# Patient Record
Sex: FEMALE | Race: WHITE | Employment: FULL TIME | URBAN - METROPOLITAN AREA
[De-identification: names, ages, dates, MRNs, and addresses within clinical notes are randomized per-mention and may not be internally consistent; named-entity substitution may affect disease eponyms.]

---

## 2019-03-09 ENCOUNTER — OFFICE VISIT (OUTPATIENT)
Dept: URGENT CARE | Facility: URGENT CARE | Age: 34
End: 2019-03-09
Payer: COMMERCIAL

## 2019-03-09 VITALS
DIASTOLIC BLOOD PRESSURE: 70 MMHG | SYSTOLIC BLOOD PRESSURE: 112 MMHG | TEMPERATURE: 98.3 F | OXYGEN SATURATION: 97 % | HEART RATE: 100 BPM

## 2019-03-09 DIAGNOSIS — R07.0 THROAT PAIN: Primary | ICD-10-CM

## 2019-03-09 DIAGNOSIS — H92.03 OTALGIA OF BOTH EARS: ICD-10-CM

## 2019-03-09 DIAGNOSIS — J02.0 STREPTOCOCCAL PHARYNGITIS: ICD-10-CM

## 2019-03-09 LAB
DEPRECATED S PYO AG THROAT QL EIA: ABNORMAL
SPECIMEN SOURCE: ABNORMAL

## 2019-03-09 PROCEDURE — 99213 OFFICE O/P EST LOW 20 MIN: CPT | Performed by: FAMILY MEDICINE

## 2019-03-09 PROCEDURE — 87880 STREP A ASSAY W/OPTIC: CPT | Performed by: PHYSICIAN ASSISTANT

## 2019-03-09 RX ORDER — PENICILLIN V POTASSIUM 500 MG/1
500 TABLET, FILM COATED ORAL 2 TIMES DAILY
Qty: 20 TABLET | Refills: 0 | Status: SHIPPED | OUTPATIENT
Start: 2019-03-09 | End: 2019-03-19

## 2019-03-09 NOTE — PROGRESS NOTES
Chief Complaint   Patient presents with     Urgent Care     Pharyngitis     Possible strep x4 days on/off- sore throat, Bilateral ear pain, hurts to swallow     SUBJECTIVE:   Mavis Golden is a 33 year old female presenting with a chief complaint of runny nose, stuffy nose, sore throat and facial pain/pressure. She is an established patient of [facility].  Onset of symptoms was 4 day(s) ago.  Course of illness is worsening.    Severity moderate  Current and Associated symptoms: ear pain bilateral  Treatment measures tried include Tylenol/Ibuprofen.  Predisposing factors include None.    Past Medical History:   Diagnosis Date     NO ACTIVE PROBLEMS      Current Outpatient Medications   Medication Sig Dispense Refill     penicillin V (VEETID) 500 MG tablet Take 1 tablet (500 mg) by mouth 2 times daily for 10 days 20 tablet 0     drospirenone-ethinyl estradiol (OCELLA) 3-0.03 MG per tablet Take 1 tablet by mouth daily. (Patient not taking: Reported on 3/9/2019) 28 tablet 0     VALTREX 1 GM OR TABS 1 TABLET 3 TIMES DAILY 21 0     SANDRA 28 3-0.03 MG OR TABS ONE TABLET DAILY, generics ok (Patient not taking: No sig reported) 90 3     ZOLOFT 50 MG OR TABS ONE TABLET DAILY (Patient not taking: No sig reported) 90 1     Social History     Tobacco Use     Smoking status: Never Smoker     Smokeless tobacco: Never Used   Substance Use Topics     Alcohol use: Yes     Comment: Occ     Family History   Problem Relation Age of Onset     Breast Cancer Paternal Grandmother         dx'ed age 41         ROS:    10 point ROS of systems including Constitutional, Eyes, Respiratory, Cardiovascular, Gastroenterology, Genitourinary, Integumentary, Muscularskeletal, Psychiatric were all negative except for pertinent positives noted in my HPI         OBJECTIVE:  /70 (BP Location: Right arm, Patient Position: Chair, Cuff Size: Adult Large)   Pulse 100   Temp 98.3  F (36.8  C) (Oral)   SpO2 97%   GENERAL APPEARANCE: healthy, alert and no  distress  EYES: EOMI,  PERRL, conjunctiva clear  HENT: ear canals and TM's normal.  Nose and mouth without ulcers, throat showed  erythema but no lesions  NECK: supple, nontender, no lymphadenopathy  RESP: lungs clear to auscultation - no rales, rhonchi or wheezes  CV: regular rates and rhythm, normal S1 S2, no murmur noted  ABDOMEN:  soft, nontender, no HSM or masses and bowel sounds normal  SKIN: no suspicious lesions or rashes  Physical Exam      X-Ray was not done.    Medical Decision Making:    Differential Diagnosis:  URI Adult/Peds:  Laryngitis, Sinusitis, Strep pharyngitis, Viral pharyngitis, Viral syndrome and Viral upper respiratory illness      ASSESSMENT:  Mavis was seen today for urgent care and pharyngitis.    Diagnoses and all orders for this visit:    Throat pain  -     Rapid strep screen    Otalgia of both ears    Streptococcal pharyngitis  -     penicillin V (VEETID) 500 MG tablet; Take 1 tablet (500 mg) by mouth 2 times daily for 10 days        PLAN:  Tylenol, Ibuprofen, Saline gargles, Saline nasal spray and Vaporizer  Will treat with penicillin for positive strep infection  Discussed with patient that it is contagious should not be sharing drinks  Follow up if  symptoms fail to improve or worsens   Pt understood and agreed with plan     Doris Tariq MD       See orders in Epic

## 2019-03-25 ENCOUNTER — OFFICE VISIT (OUTPATIENT)
Dept: URGENT CARE | Facility: URGENT CARE | Age: 34
End: 2019-03-25
Payer: COMMERCIAL

## 2019-03-25 VITALS
OXYGEN SATURATION: 99 % | SYSTOLIC BLOOD PRESSURE: 112 MMHG | DIASTOLIC BLOOD PRESSURE: 70 MMHG | TEMPERATURE: 98.1 F | HEART RATE: 96 BPM

## 2019-03-25 DIAGNOSIS — J06.9 VIRAL URI: ICD-10-CM

## 2019-03-25 DIAGNOSIS — N92.6 MISSED PERIOD: ICD-10-CM

## 2019-03-25 DIAGNOSIS — R07.0 THROAT PAIN: Primary | ICD-10-CM

## 2019-03-25 LAB
DEPRECATED S PYO AG THROAT QL EIA: NORMAL
HCG UR QL: NEGATIVE
SPECIMEN SOURCE: NORMAL

## 2019-03-25 PROCEDURE — 87081 CULTURE SCREEN ONLY: CPT | Performed by: FAMILY MEDICINE

## 2019-03-25 PROCEDURE — 81025 URINE PREGNANCY TEST: CPT | Performed by: FAMILY MEDICINE

## 2019-03-25 PROCEDURE — 87880 STREP A ASSAY W/OPTIC: CPT | Performed by: FAMILY MEDICINE

## 2019-03-25 PROCEDURE — 99214 OFFICE O/P EST MOD 30 MIN: CPT | Performed by: FAMILY MEDICINE

## 2019-03-26 NOTE — PROGRESS NOTES
SUBJECTIVE:  Chief Complaint   Patient presents with     Urgent Care     URI     Possible strep x3 days. Had been seen at the clinic due to positive strep, last dose of ABX was on 3/22. Sx- throat pain.      Mavis Golden is a 33 year old female   with a chief complaint of sore throat.  Onset of symptoms was 2 day(s) ago.    Course of illness: sudden onset, still present and constant.  Severity moderate  Current and Associated symptoms: ear pain bilateral and body aches  Treatment measures tried include Tylenol/Ibuprofen.  Predisposing factors include exposure to strep.   She was treated for strep 2 weeks ago-  Concerned infection not cleared    Also has missed her menses by a few days,  Did a home pregnancy test but there was small color change of positive test, but unsure if the test was defective/ stained.  No cramping/ abdominal pain    Past Medical History:   Diagnosis Date     NO ACTIVE PROBLEMS      Patient Active Problem List   Diagnosis     Anxiety     Recurrent cold sores     CARDIOVASCULAR SCREENING; LDL GOAL LESS THAN 160         ALLERGIES:  Patient has no known allergies.      Current Outpatient Medications on File Prior to Visit:  drospirenone-ethinyl estradiol (OCELLA) 3-0.03 MG per tablet Take 1 tablet by mouth daily. (Patient not taking: Reported on 3/9/2019)   [] penicillin V (VEETID) 500 MG tablet Take 1 tablet (500 mg) by mouth 2 times daily for 10 days   VALTREX 1 GM OR TABS 1 TABLET 3 TIMES DAILY   SANDRA 28 3-0.03 MG OR TABS ONE TABLET DAILY, generics ok (Patient not taking: No sig reported)   ZOLOFT 50 MG OR TABS ONE TABLET DAILY (Patient not taking: No sig reported)     No current facility-administered medications on file prior to visit.     Social History     Tobacco Use     Smoking status: Never Smoker     Smokeless tobacco: Never Used   Substance Use Topics     Alcohol use: Yes     Comment: Occ       Family History   Problem Relation Age of Onset     Breast Cancer Paternal  Grandmother         dx'ed age 41         ROS:  CONSTITUTIONAL:NEGATIVE for fever, chills,    EYES: NEGATIVE for vision changes or irritation  RESP:NEGATIVE for significant cough or SOB  GI: NEGATIVE for nausea, abdominal pain,     OBJECTIVE:   /70 (BP Location: Right arm, Patient Position: Chair, Cuff Size: Adult Regular)   Pulse 96   Temp 98.1  F (36.7  C) (Oral)   SpO2 99%   GENERAL APPEARANCE: alert, mild distress and cooperative  EYES: EOMI,  PERRL, conjunctiva clear  HENT: ear canals and TM's normal.  Nose normal.  Pharynx erythematous with some exudate noted.  NECK: supple, non-tender to palpation, no adenopathy noted  RESP: lungs clear to auscultation - no rales, rhonchi or wheezes  CV: regular rates and rhythm, normal S1 S2, no murmur noted  SKIN: no suspicious lesions or rashes    Results for orders placed or performed in visit on 03/25/19   HCG Qual, Urine ([id number])   Result Value Ref Range    HCG Qual Urine Negative NEG^Negative   Rapid strep screen   Result Value Ref Range    Specimen Description Throat     Rapid Strep A Screen       NEGATIVE: No Group A streptococcal antigen detected by immunoassay, await culture report.         ASSESSMENT:  Throat pain     - Rapid strep screen  - Beta strep group A culture    Missed period      - HCG Qual, Urine ([id number])   discussed that she is in a stage that urine HCG is not completely accurate due to possible very early pregnancy-  Suggest she repeat test in 2 weeks    Viral URI    discussed with the patient that a confirmatory strep culture will be performed and that she will be called if the culture is positive for strep.  We discussed other possible causes of pharyngitis including cold viruses and mononucleosis.  - she said she had mono in her youth     Symptomatic treat with gargles, lozenges, and OTC analgesic as needed. Follow-up with primary clinic if not improving.

## 2019-03-27 LAB
BACTERIA SPEC CULT: NORMAL
SPECIMEN SOURCE: NORMAL

## 2019-09-29 ENCOUNTER — HEALTH MAINTENANCE LETTER (OUTPATIENT)
Age: 34
End: 2019-09-29

## 2019-11-08 ENCOUNTER — OFFICE VISIT (OUTPATIENT)
Dept: URGENT CARE | Facility: URGENT CARE | Age: 34
End: 2019-11-08
Payer: COMMERCIAL

## 2019-11-08 VITALS
WEIGHT: 231 LBS | OXYGEN SATURATION: 98 % | RESPIRATION RATE: 16 BRPM | HEART RATE: 83 BPM | DIASTOLIC BLOOD PRESSURE: 78 MMHG | TEMPERATURE: 98.2 F | SYSTOLIC BLOOD PRESSURE: 124 MMHG

## 2019-11-08 DIAGNOSIS — R07.0 THROAT PAIN: Primary | ICD-10-CM

## 2019-11-08 LAB
DEPRECATED S PYO AG THROAT QL EIA: NORMAL
SPECIMEN SOURCE: NORMAL

## 2019-11-08 PROCEDURE — 87081 CULTURE SCREEN ONLY: CPT | Performed by: HOSPITALIST

## 2019-11-08 PROCEDURE — 99213 OFFICE O/P EST LOW 20 MIN: CPT | Performed by: HOSPITALIST

## 2019-11-08 PROCEDURE — 87880 STREP A ASSAY W/OPTIC: CPT | Performed by: HOSPITALIST

## 2019-11-08 RX ORDER — FERROUS SULFATE 325(65) MG
325 TABLET, DELAYED RELEASE (ENTERIC COATED) ORAL
COMMUNITY
Start: 2019-09-13

## 2019-11-08 NOTE — PROGRESS NOTES
Pt came here for sore throat and ear pain. Pt also has low grade fever. Started last night. Pt has some co worker that was diagnosed with strep too.     No Known Allergies    Past Medical History:   Diagnosis Date     NO ACTIVE PROBLEMS        ferrous sulfate (FE TABS) 325 (65 Fe) MG EC tablet, Take 325 mg by mouth  VALTREX 1 GM OR TABS, 1 TABLET 3 TIMES DAILY  ZOLOFT 50 MG OR TABS, ONE TABLET DAILY (Patient not taking: No sig reported)    No current facility-administered medications on file prior to visit.       Social History     Tobacco Use     Smoking status: Never Smoker     Smokeless tobacco: Never Used   Substance Use Topics     Alcohol use: Yes     Comment: Occ       ROS:  12 point ROS is done and aside that mention above all other review of system is negative    OBJECTIVE:  /78   Pulse 83   Temp 98.2  F (36.8  C) (Oral)   Resp 16   Wt 104.8 kg (231 lb)   SpO2 98%   GENERAL APPEARANCE: healthy, alert and moderate distress  EYES: conjunctiva clear  EARS:no cerumen.   Ear canals no erythema, TM's intact no erythema .    NOSE/MOUTH: Nose and mouth is normal, no erythema or lesions  THROAT: mild erythema w/ no tonsillar enlargement . mild exudates  NECK: supple, nontender, no lymphadenopathy  RESP: lungs clear to auscultation - no rales, rhonchi or wheezes  CV: regular rates and rhythm, normal S1 S2, no murmur noted  NEURO: awake, alert        Recent Results (from the past 168 hour(s))   Strep, Rapid Screen    Collection Time: 11/08/19  5:42 PM   Result Value Ref Range    Specimen Description Throat     Rapid Strep A Screen       NEGATIVE: No Group A streptococcal antigen detected by immunoassay, await culture report.        ASSESSMENT:     ICD-10-CM    1. Throat pain R07.0 Strep, Rapid Screen     Beta strep group A culture         PLAN:    Seem to be viral in origin. Tylenol for pain. Or fever, pt is pregnant, avoid NSAID  Lots of rest and fluids.  Follow up in 4-7 days if not better or sooner if  getting worse .    Ron Harrington MD MD

## 2019-11-09 LAB
BACTERIA SPEC CULT: NORMAL
SPECIMEN SOURCE: NORMAL

## 2021-01-14 ENCOUNTER — HEALTH MAINTENANCE LETTER (OUTPATIENT)
Age: 36
End: 2021-01-14

## 2021-10-24 ENCOUNTER — HEALTH MAINTENANCE LETTER (OUTPATIENT)
Age: 36
End: 2021-10-24

## 2022-02-13 ENCOUNTER — HEALTH MAINTENANCE LETTER (OUTPATIENT)
Age: 37
End: 2022-02-13

## 2022-10-15 ENCOUNTER — HEALTH MAINTENANCE LETTER (OUTPATIENT)
Age: 37
End: 2022-10-15

## 2023-03-26 ENCOUNTER — HEALTH MAINTENANCE LETTER (OUTPATIENT)
Age: 38
End: 2023-03-26